# Patient Record
Sex: FEMALE | Race: WHITE | NOT HISPANIC OR LATINO | Employment: FULL TIME | URBAN - METROPOLITAN AREA
[De-identification: names, ages, dates, MRNs, and addresses within clinical notes are randomized per-mention and may not be internally consistent; named-entity substitution may affect disease eponyms.]

---

## 2024-01-13 ENCOUNTER — OFFICE VISIT (OUTPATIENT)
Dept: URGENT CARE | Facility: CLINIC | Age: 64
End: 2024-01-13
Payer: COMMERCIAL

## 2024-01-13 VITALS
WEIGHT: 185.63 LBS | SYSTOLIC BLOOD PRESSURE: 146 MMHG | HEIGHT: 68 IN | HEART RATE: 88 BPM | BODY MASS INDEX: 28.13 KG/M2 | DIASTOLIC BLOOD PRESSURE: 90 MMHG | TEMPERATURE: 98 F | OXYGEN SATURATION: 99 % | RESPIRATION RATE: 16 BRPM

## 2024-01-13 DIAGNOSIS — I10 HIGH BLOOD PRESSURE DETERMINED BY EXAMINATION: Primary | ICD-10-CM

## 2024-01-13 PROCEDURE — 99214 OFFICE O/P EST MOD 30 MIN: CPT | Mod: S$GLB,,, | Performed by: NURSE PRACTITIONER

## 2024-01-13 NOTE — PROGRESS NOTES
"Subjective:      Patient ID: Letha Easley is a 63 y.o. female.    Vitals:  height is 5' 8" (1.727 m) and weight is 84.2 kg (185 lb 9.6 oz). Her oral temperature is 98.3 °F (36.8 °C). Her blood pressure is 146/90 (abnormal) and her pulse is 88. Her respiration is 16 and oxygen saturation is 99%.     Chief Complaint: Hypertension    Mrs. Easley is a pleasant 63-year-old female who was seen today for self-reported hypertension.  She states she works at [employer] and routinely uses the automated blood pressure cuff chair at the pharmacy to check her blood pressure and has found that it has been running high routinely.  She denies any associated symptoms.  She denies using any recent stimulants or decongestants.  She denies having a PCP or recent physical exam but does have an appointment to establish primary care in approximately 11 days.  She reports wanting to get "checked out" today as she has become nervous regarding her blood pressure readings on this machine.  The highest reported reading she states she has seen has been approximately 190/100.    Hypertension  This is a new problem. The current episode started more than 1 month ago. The problem is unchanged. The problem is uncontrolled. Pertinent negatives include no chest pain, neck pain, palpitations or shortness of breath. Agents associated with hypertension include decongestants. Risk factors for coronary artery disease include post-menopausal state, family history and smoking/tobacco exposure. Past treatments include nothing. There are no compliance problems.        Constitution: Negative for chills and fever.   HENT:  Negative for congestion and sore throat.    Neck: Negative for neck pain.   Cardiovascular:  Negative for chest pain, palpitations and sob on exertion.   Respiratory:  Negative for shortness of breath.    Gastrointestinal:  Negative for nausea and vomiting.   Musculoskeletal:  Negative for back pain.   Neurological:  Negative for dizziness, " light-headedness, passing out, disorientation, altered mental status, numbness and tingling.   Psychiatric/Behavioral:  Negative for altered mental status, disorientation and confusion.       Objective:     Physical Exam   Constitutional: She is oriented to person, place, and time. She appears well-developed. She is cooperative.  Non-toxic appearance. She does not appear ill. No distress.   HENT:   Head: Normocephalic and atraumatic.   Ears:   Right Ear: External ear normal.   Left Ear: External ear normal.   Nose: Nose normal.   Mouth/Throat: Oropharynx is clear and moist and mucous membranes are normal. Oropharynx is clear.   Eyes: Conjunctivae and lids are normal. Pupils are equal, round, and reactive to light. No scleral icterus. Extraocular movement intact   Neck: Trachea normal and phonation normal. Neck supple.   Cardiovascular: Normal rate, regular rhythm, normal heart sounds and normal pulses.   Pulmonary/Chest: Effort normal and breath sounds normal. No respiratory distress.   Abdominal: Normal appearance.   Musculoskeletal:         General: No deformity.   Neurological: no focal deficit. She is alert and oriented to person, place, and time. She has normal strength and normal reflexes. No sensory deficit.   Skin: Skin is warm, dry, intact and not diaphoretic. Capillary refill takes 2 to 3 seconds.   Psychiatric: Her speech is normal and behavior is normal. Judgment and thought content normal.   Nursing note and vitals reviewed.      Assessment:     1. High blood pressure determined by examination        Plan:       High blood pressure determined by examination      I have discussed the  physical exam findings with the patient.  She has a fairly favorable physical exam without any evidence of distress.  Her blood pressure is high today, however I do not feel the need to send her to the emergency room as there are no associated symptoms.  We spoke at length regarding conservative care measures and blood  pressure monitoring until her appointment with her new PCP in 10 days.  We also discussed good food choices and adding an exercise regimen to her daily routine.  She verbalized understanding and agreement with the plan of care.

## 2024-01-13 NOTE — PATIENT INSTRUCTIONS
Increase clear fluid intake  Increase lean meats and vegetables in your diet.  Avoid processed or canned foods  Obtain an automatic blood pressure cuff and measure blood pressure twice daily.  Keep a log of this and present this log to your primary care provider at your next appointment  Get 30 minutes of exercise at least 5 minutes daily.  Get adequate sleep  Avoid stimulants such as caffeine and decongestants such as Sudafed or phenylephrine.  Go directly to the emergency room if you develop chest pain, shortness for breath, dizziness, blurred vision, other emergent concerns  Return to this clinic for any new need    .[email or web address]

## 2024-03-02 NOTE — PROGRESS NOTES
Chart sent to medical director per requirement for [place] collaborative agreement requirements.